# Patient Record
Sex: MALE | Race: WHITE
[De-identification: names, ages, dates, MRNs, and addresses within clinical notes are randomized per-mention and may not be internally consistent; named-entity substitution may affect disease eponyms.]

---

## 2017-02-28 ENCOUNTER — APPOINTMENT (OUTPATIENT)
Dept: PODIATRY | Facility: CLINIC | Age: 57
End: 2017-02-28

## 2018-05-24 ENCOUNTER — EMERGENCY (EMERGENCY)
Facility: HOSPITAL | Age: 58
LOS: 0 days | Discharge: HOME | End: 2018-05-24
Admitting: PHYSICIAN ASSISTANT

## 2018-05-24 VITALS
SYSTOLIC BLOOD PRESSURE: 166 MMHG | OXYGEN SATURATION: 99 % | TEMPERATURE: 98 F | HEART RATE: 75 BPM | RESPIRATION RATE: 18 BRPM | DIASTOLIC BLOOD PRESSURE: 88 MMHG

## 2018-05-24 DIAGNOSIS — J18.9 PNEUMONIA, UNSPECIFIED ORGANISM: ICD-10-CM

## 2018-05-24 DIAGNOSIS — Z88.0 ALLERGY STATUS TO PENICILLIN: ICD-10-CM

## 2018-05-24 DIAGNOSIS — Z88.1 ALLERGY STATUS TO OTHER ANTIBIOTIC AGENTS STATUS: ICD-10-CM

## 2018-05-24 DIAGNOSIS — J45.909 UNSPECIFIED ASTHMA, UNCOMPLICATED: ICD-10-CM

## 2018-05-24 DIAGNOSIS — R05 COUGH: ICD-10-CM

## 2018-05-24 NOTE — ED PROVIDER NOTE - NS ED ROS FT
Constitutional: no fever, chills, no recent weight loss, change in appetite or malaise  Eyes: no redness/discharge/pain/vision changes  ENT: no rhinorrhea/ear pain/sore throat  Cardiac: No chest pain, SOB or edema.  Respiratory: No respiratory distress  GI: No nausea, vomiting, diarrhea or abdominal pain.  : No dysuria, frequency, urgency or hematuria  MS: no pain to back or extremities, no loss of ROM, no weakness  Neuro: No headache or weakness. No LOC.  Skin: No skin rash.  Endocrine: No history of thyroid disease or diabetes.  Except as documented in the HPI, all other systems are negative.

## 2018-05-24 NOTE — ED PROVIDER NOTE - PROGRESS NOTE DETAILS
Pt aware that we will have official radiology read pending, and may result in change of xray read.  Pt voices understanding of use of medications, instructions for care, and reasons to return or to go to ED  pt has good aeration. full sentences. looks well.   limits of zpak dw pt who agrees return if worse.    pt agrees to ER if any worse.  options of abx, waiting, inhaler, steroids, OTC rx all considered and dw pt.  dw pt caution advised with medication's that make you drowsy

## 2018-05-24 NOTE — ED PROVIDER NOTE - OBJECTIVE STATEMENT
cough for 2 weeks, productive of green sputum  no f/c/n/v, sob/cp, etc  no sick contacts, no recent travel  h/o bronchitis in the past, feels the same

## 2018-06-06 ENCOUNTER — EMERGENCY (EMERGENCY)
Facility: HOSPITAL | Age: 58
LOS: 0 days | Discharge: HOME | End: 2018-06-06
Admitting: PHYSICIAN ASSISTANT

## 2018-06-06 VITALS
DIASTOLIC BLOOD PRESSURE: 88 MMHG | HEART RATE: 64 BPM | SYSTOLIC BLOOD PRESSURE: 169 MMHG | RESPIRATION RATE: 18 BRPM | TEMPERATURE: 98 F | OXYGEN SATURATION: 100 %

## 2018-06-06 DIAGNOSIS — M10.9 GOUT, UNSPECIFIED: ICD-10-CM

## 2018-06-06 DIAGNOSIS — Z88.1 ALLERGY STATUS TO OTHER ANTIBIOTIC AGENTS STATUS: ICD-10-CM

## 2018-06-06 DIAGNOSIS — Z79.899 OTHER LONG TERM (CURRENT) DRUG THERAPY: ICD-10-CM

## 2018-06-06 DIAGNOSIS — J45.909 UNSPECIFIED ASTHMA, UNCOMPLICATED: ICD-10-CM

## 2018-06-06 DIAGNOSIS — Z88.0 ALLERGY STATUS TO PENICILLIN: ICD-10-CM

## 2018-06-06 DIAGNOSIS — F17.200 NICOTINE DEPENDENCE, UNSPECIFIED, UNCOMPLICATED: ICD-10-CM

## 2018-06-06 RX ORDER — FLUTICASONE PROPIONATE AND SALMETEROL 50; 250 UG/1; UG/1
1 POWDER ORAL; RESPIRATORY (INHALATION)
Qty: 0 | Refills: 0 | COMMUNITY

## 2018-06-06 RX ORDER — ALLOPURINOL 300 MG
0 TABLET ORAL
Qty: 0 | Refills: 0 | COMMUNITY

## 2018-06-06 RX ORDER — ALLOPURINOL 300 MG
2 TABLET ORAL
Qty: 84 | Refills: 0 | OUTPATIENT
Start: 2018-06-06 | End: 2018-06-19

## 2018-06-06 RX ORDER — ALBUTEROL 90 UG/1
2 AEROSOL, METERED ORAL
Qty: 0 | Refills: 0 | COMMUNITY

## 2018-06-06 RX ORDER — COLCHICINE 0.6 MG
2 TABLET ORAL
Qty: 3 | Refills: 0 | OUTPATIENT
Start: 2018-06-06 | End: 2018-06-06

## 2018-06-06 NOTE — ED PROVIDER NOTE - MEDICAL DECISION MAKING DETAILS
colchicine; refill allopurinol; follow up with PCP in 2-3 days; any new or worsening symptoms, pt will return to ER colchicine; refill allopurinol; follow up with PCP in 2-3 days; any new or worsening symptoms, pt will return to ER  Note complete

## 2018-06-06 NOTE — ED PROVIDER NOTE - OBJECTIVE STATEMENT
57 y.o. male with a PMHx of gout presented to ER c/o gout flare up 1st great toe since this AM.  Pt poorly compliant with diet.  Pt also here for re-check and follow up pneumonia.  Pt completed full course of po Levaquin.  Pt denies fever, chills, SOB.  Doing better.  Pt only seen here 5/25/18.  CXR officially read as negative. 57 y.o. male with a PMHx of gout presented to ER c/o gout flare up 1st great toe since this AM.  Pt poorly compliant with diet.  Pt also here for re-check and follow up pneumonia.  Pt completed full course of po Levaquin.  Pt denies fever, chills, SOB.  Doing better.  Pt only seen here 5/24/18.  CXR officially read as negative.

## 2018-06-06 NOTE — ED PROVIDER NOTE - MUSCULOSKELETAL, MLM
Spine appears normal, range of motion is not limited, (+) mild tenderness without erythema or swelling Right first MTP joint; FROM, no motor or sensory deficit; cap refill < 2 sec

## 2018-08-09 PROBLEM — J45.909 UNSPECIFIED ASTHMA, UNCOMPLICATED: Chronic | Status: ACTIVE | Noted: 2018-05-24

## 2018-08-09 PROBLEM — M10.9 GOUT, UNSPECIFIED: Chronic | Status: ACTIVE | Noted: 2018-05-24

## 2018-08-15 ENCOUNTER — OUTPATIENT (OUTPATIENT)
Dept: OUTPATIENT SERVICES | Facility: HOSPITAL | Age: 58
LOS: 1 days | Discharge: HOME | End: 2018-08-15

## 2018-08-15 DIAGNOSIS — N63.10 UNSPECIFIED LUMP IN THE RIGHT BREAST, UNSPECIFIED QUADRANT: ICD-10-CM

## 2018-08-15 DIAGNOSIS — N63.20 UNSPECIFIED LUMP IN THE LEFT BREAST, UNSPECIFIED QUADRANT: ICD-10-CM

## 2018-08-15 DIAGNOSIS — N62 HYPERTROPHY OF BREAST: ICD-10-CM

## 2018-10-19 ENCOUNTER — EMERGENCY (EMERGENCY)
Facility: HOSPITAL | Age: 58
LOS: 0 days | Discharge: HOME | End: 2018-10-19
Admitting: PHYSICIAN ASSISTANT

## 2018-10-19 VITALS
DIASTOLIC BLOOD PRESSURE: 105 MMHG | RESPIRATION RATE: 18 BRPM | SYSTOLIC BLOOD PRESSURE: 182 MMHG | TEMPERATURE: 98 F | OXYGEN SATURATION: 97 % | HEART RATE: 68 BPM

## 2018-10-19 VITALS — HEART RATE: 70 BPM | DIASTOLIC BLOOD PRESSURE: 90 MMHG | SYSTOLIC BLOOD PRESSURE: 179 MMHG

## 2018-10-19 DIAGNOSIS — Z79.2 LONG TERM (CURRENT) USE OF ANTIBIOTICS: ICD-10-CM

## 2018-10-19 DIAGNOSIS — Z88.0 ALLERGY STATUS TO PENICILLIN: ICD-10-CM

## 2018-10-19 DIAGNOSIS — S05.01XA INJURY OF CONJUNCTIVA AND CORNEAL ABRASION WITHOUT FOREIGN BODY, RIGHT EYE, INITIAL ENCOUNTER: ICD-10-CM

## 2018-10-19 DIAGNOSIS — Z79.51 LONG TERM (CURRENT) USE OF INHALED STEROIDS: ICD-10-CM
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
## 2018-10-19 DIAGNOSIS — J45.909 UNSPECIFIED ASTHMA, UNCOMPLICATED: ICD-10-CM

## 2018-10-19 DIAGNOSIS — Z79.899 OTHER LONG TERM (CURRENT) DRUG THERAPY: ICD-10-CM
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
## 2018-10-19 DIAGNOSIS — H57.89 OTHER SPECIFIED DISORDERS OF EYE AND ADNEXA: ICD-10-CM

## 2018-10-19 DIAGNOSIS — Z88.1 ALLERGY STATUS TO OTHER ANTIBIOTIC AGENTS STATUS: ICD-10-CM

## 2018-10-19 RX ORDER — POLYMYXIN B SULF/TRIMETHOPRIM 10000-1/ML
1 DROPS OPHTHALMIC (EYE)
Qty: 1 | Refills: 0 | OUTPATIENT
Start: 2018-10-19 | End: 2018-10-25

## 2018-10-19 NOTE — ED ADULT NURSE NOTE - OBJECTIVE STATEMENT
Pt came to the ER today with c/o right eye discharge and tearing for 5 days. denies change in vision denies blurry vision.

## 2018-10-19 NOTE — ED PROVIDER NOTE - NS ED ROS FT
Constitutional: (-) fever  Eyes: (+) tearing, itchy, and white discharge right eye  Neurological: (-) altered mental status

## 2018-10-19 NOTE — ED PROVIDER NOTE - MEDICAL DECISION MAKING DETAILS
Right eye injected, floursein stain +abrasion, abx eye drops prescribed, advised f/u with Eye doctor

## 2018-10-19 NOTE — ED PROVIDER NOTE - OBJECTIVE STATEMENT
59 yo F hx of gout, asthma here c/o right eye itchy and white discharge x 4 days. Patient states he has been rubbing his eye a lot and had some pain 2 days ago. +increased tearing since. No blurry or changes in vision. No HA or n/v.

## 2018-10-19 NOTE — ED PROVIDER NOTE - PHYSICAL EXAMINATION
Physical Exam    Vital Signs: I have reviewed the initial vital signs.  Constitutional: well-nourished, appears stated age, no acute distress  Eyes: PERRL, EOMI. VA 20/40 OD, 20/30 OS. Right eye injected,  +corneal abrasion seen with Floursein stain. No discharge noted  Neuro: AOx3, Motor 5/5, Sensation: equal and intact

## 2019-01-30 ENCOUNTER — OUTPATIENT (OUTPATIENT)
Dept: OUTPATIENT SERVICES | Facility: HOSPITAL | Age: 59
LOS: 1 days | Discharge: HOME | End: 2019-01-30

## 2019-01-30 DIAGNOSIS — M25.512 PAIN IN LEFT SHOULDER: ICD-10-CM

## 2019-07-11 ENCOUNTER — EMERGENCY (EMERGENCY)
Facility: HOSPITAL | Age: 59
LOS: 0 days | Discharge: HOME | End: 2019-07-11
Attending: EMERGENCY MEDICINE | Admitting: EMERGENCY MEDICINE
Payer: COMMERCIAL

## 2019-07-11 VITALS
RESPIRATION RATE: 20 BRPM | HEART RATE: 66 BPM | DIASTOLIC BLOOD PRESSURE: 85 MMHG | SYSTOLIC BLOOD PRESSURE: 170 MMHG | TEMPERATURE: 97 F | OXYGEN SATURATION: 98 %

## 2019-07-11 DIAGNOSIS — Z79.51 LONG TERM (CURRENT) USE OF INHALED STEROIDS: ICD-10-CM

## 2019-07-11 DIAGNOSIS — Z79.899 OTHER LONG TERM (CURRENT) DRUG THERAPY: ICD-10-CM

## 2019-07-11 DIAGNOSIS — R42 DIZZINESS AND GIDDINESS: ICD-10-CM

## 2019-07-11 DIAGNOSIS — H93.12 TINNITUS, LEFT EAR: ICD-10-CM

## 2019-07-11 DIAGNOSIS — J45.909 UNSPECIFIED ASTHMA, UNCOMPLICATED: ICD-10-CM

## 2019-07-11 DIAGNOSIS — R11.2 NAUSEA WITH VOMITING, UNSPECIFIED: ICD-10-CM

## 2019-07-11 LAB
ALBUMIN SERPL ELPH-MCNC: 4.8 G/DL — SIGNIFICANT CHANGE UP (ref 3.5–5.2)
ALP SERPL-CCNC: 65 U/L — SIGNIFICANT CHANGE UP (ref 30–115)
ALT FLD-CCNC: 24 U/L — SIGNIFICANT CHANGE UP (ref 0–41)
ANION GAP SERPL CALC-SCNC: 13 MMOL/L — SIGNIFICANT CHANGE UP (ref 7–14)
AST SERPL-CCNC: 22 U/L — SIGNIFICANT CHANGE UP (ref 0–41)
BASOPHILS # BLD AUTO: 0.03 K/UL — SIGNIFICANT CHANGE UP (ref 0–0.2)
BASOPHILS NFR BLD AUTO: 0.3 % — SIGNIFICANT CHANGE UP (ref 0–1)
BILIRUB SERPL-MCNC: 1 MG/DL — SIGNIFICANT CHANGE UP (ref 0.2–1.2)
BUN SERPL-MCNC: 15 MG/DL — SIGNIFICANT CHANGE UP (ref 10–20)
CALCIUM SERPL-MCNC: 10.1 MG/DL — SIGNIFICANT CHANGE UP (ref 8.5–10.1)
CHLORIDE SERPL-SCNC: 102 MMOL/L — SIGNIFICANT CHANGE UP (ref 98–110)
CO2 SERPL-SCNC: 26 MMOL/L — SIGNIFICANT CHANGE UP (ref 17–32)
CREAT SERPL-MCNC: 1 MG/DL — SIGNIFICANT CHANGE UP (ref 0.7–1.5)
EOSINOPHIL # BLD AUTO: 0.01 K/UL — SIGNIFICANT CHANGE UP (ref 0–0.7)
EOSINOPHIL NFR BLD AUTO: 0.1 % — SIGNIFICANT CHANGE UP (ref 0–8)
GLUCOSE SERPL-MCNC: 123 MG/DL — HIGH (ref 70–99)
HCT VFR BLD CALC: 46.1 % — SIGNIFICANT CHANGE UP (ref 42–52)
HGB BLD-MCNC: 16 G/DL — SIGNIFICANT CHANGE UP (ref 14–18)
IMM GRANULOCYTES NFR BLD AUTO: 0.3 % — SIGNIFICANT CHANGE UP (ref 0.1–0.3)
LYMPHOCYTES # BLD AUTO: 1.03 K/UL — LOW (ref 1.2–3.4)
LYMPHOCYTES # BLD AUTO: 11.9 % — LOW (ref 20.5–51.1)
MCHC RBC-ENTMCNC: 31.1 PG — HIGH (ref 27–31)
MCHC RBC-ENTMCNC: 34.7 G/DL — SIGNIFICANT CHANGE UP (ref 32–37)
MCV RBC AUTO: 89.7 FL — SIGNIFICANT CHANGE UP (ref 80–94)
MONOCYTES # BLD AUTO: 0.39 K/UL — SIGNIFICANT CHANGE UP (ref 0.1–0.6)
MONOCYTES NFR BLD AUTO: 4.5 % — SIGNIFICANT CHANGE UP (ref 1.7–9.3)
NEUTROPHILS # BLD AUTO: 7.18 K/UL — HIGH (ref 1.4–6.5)
NEUTROPHILS NFR BLD AUTO: 82.9 % — HIGH (ref 42.2–75.2)
NRBC # BLD: 0 /100 WBCS — SIGNIFICANT CHANGE UP (ref 0–0)
PLATELET # BLD AUTO: 259 K/UL — SIGNIFICANT CHANGE UP (ref 130–400)
POTASSIUM SERPL-MCNC: 5 MMOL/L — SIGNIFICANT CHANGE UP (ref 3.5–5)
POTASSIUM SERPL-SCNC: 5 MMOL/L — SIGNIFICANT CHANGE UP (ref 3.5–5)
PROT SERPL-MCNC: 7.4 G/DL — SIGNIFICANT CHANGE UP (ref 6–8)
RBC # BLD: 5.14 M/UL — SIGNIFICANT CHANGE UP (ref 4.7–6.1)
RBC # FLD: 13.5 % — SIGNIFICANT CHANGE UP (ref 11.5–14.5)
SODIUM SERPL-SCNC: 141 MMOL/L — SIGNIFICANT CHANGE UP (ref 135–146)
WBC # BLD: 8.67 K/UL — SIGNIFICANT CHANGE UP (ref 4.8–10.8)
WBC # FLD AUTO: 8.67 K/UL — SIGNIFICANT CHANGE UP (ref 4.8–10.8)

## 2019-07-11 PROCEDURE — 99284 EMERGENCY DEPT VISIT MOD MDM: CPT

## 2019-07-11 PROCEDURE — 70450 CT HEAD/BRAIN W/O DYE: CPT | Mod: 26

## 2019-07-11 RX ORDER — MECLIZINE HCL 12.5 MG
50 TABLET ORAL ONCE
Refills: 0 | Status: COMPLETED | OUTPATIENT
Start: 2019-07-11 | End: 2019-07-11

## 2019-07-11 RX ORDER — SODIUM CHLORIDE 9 MG/ML
1000 INJECTION INTRAMUSCULAR; INTRAVENOUS; SUBCUTANEOUS ONCE
Refills: 0 | Status: COMPLETED | OUTPATIENT
Start: 2019-07-11 | End: 2019-07-11

## 2019-07-11 RX ORDER — METOCLOPRAMIDE HCL 10 MG
10 TABLET ORAL ONCE
Refills: 0 | Status: COMPLETED | OUTPATIENT
Start: 2019-07-11 | End: 2019-07-11

## 2019-07-11 RX ORDER — MECLIZINE HCL 12.5 MG
1 TABLET ORAL
Qty: 21 | Refills: 0
Start: 2019-07-11 | End: 2019-07-17

## 2019-07-11 RX ADMIN — SODIUM CHLORIDE 1000 MILLILITER(S): 9 INJECTION INTRAMUSCULAR; INTRAVENOUS; SUBCUTANEOUS at 10:36

## 2019-07-11 RX ADMIN — Medication 50 MILLIGRAM(S): at 11:03

## 2019-07-11 RX ADMIN — Medication 10 MILLIGRAM(S): at 10:36

## 2019-07-11 NOTE — ED PROVIDER NOTE - NSFOLLOWUPINSTRUCTIONS_ED_ALL_ED_FT
Viral Gastroenteritis, Adult    Viral gastroenteritis is also known as the stomach flu. This condition is caused by certain germs (viruses). These germs can be passed from person to person very easily (are very contagious). This condition can cause sudden watery poop (diarrhea), fever, and throwing up (vomiting).    Having watery poop and throwing up can make you feel weak and cause you to get dehydrated. Dehydration can make you tired and thirsty, make you have a dry mouth, and make it so you pee (urinate) less often. Older adults and people with other diseases or a weak defense system (immune system) are at higher risk for dehydration. It is important to replace the fluids that you lose from having watery poop and throwing up.    HOME CARE  Follow instructions from your doctor about how to care for yourself at home.    Eating and Drinking    Follow these instructions as told by your doctor:    Take an oral rehydration solution (ORS). This is a drink that is sold at pharmacies and stores.   Drink clear fluids in small amounts as you are able, such as:  Water.  Ice chips.  Diluted fruit juice.  Low-calorie sports drinks.  Eat bland, easy-to-digest foods in small amounts as you are able, such as:  Bananas.  Applesauce.  Rice.  Low-fat (lean) meats.  Toast.  Crackers.  Avoid fluids that have a lot of sugar or caffeine in them, such as:  Energy drinks.  Sports drinks.  Soda.  Avoid alcohol.  Avoid spicy or fatty foods.    General Instructions    Drink enough fluid to keep your pee (urine) clear or pale yellow.  Wash your hands often. If you cannot use soap and water, use hand .  Make sure that all people in your home wash their hands well and often.  Rest at home while you get better.  Take over-the-counter and prescription medicines only as told by your doctor.  Watch your condition for any changes.  Take a warm bath to help with any burning or pain from having watery poop.  Keep all follow-up visits as told by your doctor. This is important.    GET HELP IF:  You cannot keep fluids down.  Your symptoms get worse.  You have new symptoms.  You feel light-headed or dizzy.  You have muscle cramps.    GET HELP RIGHT AWAY IF:  You have chest pain.  You feel very weak or you pass out (faint).  You see blood in your throw-up.  Your throw-up looks like coffee grounds.  You have bloody or black poop (stools) or poop that look like tar.  You have a very bad headache, a stiff neck, or both.  You have a rash.  You have very bad pain, cramping, or bloating in your belly (abdomen).  You have trouble breathing.  You are breathing very quickly.  Your heart is beating very quickly.  Your skin feels cold and clammy.  You feel confused.  You have pain when you pee.  You have signs of dehydration, such as:  Dark pee, hardly any pee, or no pee.  Cracked lips.  Dry mouth.  Sunken eyes.  Sleepiness.  Weakness.    ADDITIONAL NOTES AND INSTRUCTIONS    Please follow up with your Primary MD in 24-48 hr.  Seek immediate medical care for any new/worsening signs or symptoms.

## 2019-07-11 NOTE — ED ADULT NURSE NOTE - OBJECTIVE STATEMENT
Pt aox3, in no acute distress, c/o dizziness and vomiting that began today. Pt states he felt as if the room were spinning. Pt denies headache or any pain, denies diarrhea. Pt taken to CT scan, wife at bedside, will continue to monitor.

## 2019-07-11 NOTE — ED PROVIDER NOTE - CARE PROVIDER_API CALL
Lawson Meyer)  Otolaryngology  [address] [place] [address], [address] [place] [address]  Phone: [phone]  Fax: [phone]  Follow Up Time:

## 2019-07-11 NOTE — ED PROVIDER NOTE - NS ED ROS FT
Constitutional:  See HPI.   Eyes:  No visual changes, eye pain or discharge.  ENMT:  No hearing changes, pain, discharge or infections. No neck pain or stiffness.  Cardiac:  No chest pain, SOB or edema. No chest pain with exertion.  Respiratory:  No cough or respiratory distress. No hemoptysis.  GI:  No  diarrhea, abdominal pain.  :  No dysuria, frequency, hematuria  MS:  No joint pain or back pain.  Neuro:  No LOC. No headache or weakness.    Skin:  No skin rash.  Except as in HPI, all other review of systems is negative

## 2019-07-11 NOTE — ED PROVIDER NOTE - PHYSICAL EXAMINATION
Constitutional: Well appearing. No acute distress. Non toxic.   Eyes: PERRLA. Extraocular movements intact, no entrapment. Conjunctiva normal.   Ears: Normal EAM's b/l. b/l TMs clear- no perforation, +light reflex. No mastoid tenderness.  Nose: No turbinate edema, hematoma, epistaxis.  Throat: +MMM. No posterior oropharyngeal erythema, edema, lesions.  Uvula midline. Floor of mouth soft.   Neck: Supple, non tender, full range of motion.  CV: RRR no murmurs, rubs, or gallops. +S1S2.   Pulm: Clear to auscultation bilaterally. Normal work of breathing.  Abd: soft NT ND +BS.   Ext: Warm and well perfused x4, moving all extremities, no edema.   Psy: Cooperative, appropriate.   Skin: Warm, dry, no rash  Neuro: CN2-12 grossly intact no sensory or motor deficits throughout, no drift, no ataxia, rapid alternating within normal limits, neg romberg.

## 2019-07-11 NOTE — ED PROVIDER NOTE - CHPI ED SYMPTOMS NEG
no fever/no loss of consciousness/no numbness/no weakness/no confusion/no blurred vision/no change in level of consciousness

## 2019-07-11 NOTE — ED PROVIDER NOTE - CLINICAL SUMMARY MEDICAL DECISION MAKING FREE TEXT BOX
Pt presenting with vertigo, labs, CTH normal. Pt feeling improved. PERRLA, neck full ROM, no nuchal rigidity, CN2-12 grossly intact, no sensory or motor deficits throughout, no ataxia, no drift. Rapid alternating intact. Pt has follow-up with ENT next week

## 2019-07-11 NOTE — ED PROVIDER NOTE - PROGRESS NOTE DETAILS
Pt feeling improved. PERRLA, neck full ROM, no nuchal rigidity, CN2-12 grossly intact, no sensory or motor deficits throughout, no ataxia, no drift. Rapid alternating intact. Pt feeling improved. PERRLA, neck full ROM, no nuchal rigidity, CN2-12 grossly intact, no sensory or motor deficits throughout, no ataxia, no drift. Rapid alternating intact. Tolerating PO. Patient and family aware of all results, given a copy of all results, comfortable with discharge and follow-up outpatient, strict return precautions given. Patient endorses understanding of all of this and aware that they can return at any time for new or concerning symptoms. No further questions or concerns at this time

## 2019-07-11 NOTE — ED ADULT NURSE NOTE - NSIMPLEMENTINTERV_GEN_ALL_ED
Implemented All Universal Safety Interventions:  [place] to call system. Call bell, personal items and telephone within reach. Instruct patient to call for assistance. Room bathroom lighting operational. Non-slip footwear when patient is off stretcher. Physically safe environment: no spills, clutter or unnecessary equipment. Stretcher in lowest position, wheels locked, appropriate side rails in place.

## 2019-07-15 ENCOUNTER — APPOINTMENT (OUTPATIENT)
Dept: OTOLARYNGOLOGY | Facility: CLINIC | Age: 59
End: 2019-07-15
Payer: COMMERCIAL

## 2019-07-15 VITALS — HEIGHT: 68 IN

## 2019-07-15 DIAGNOSIS — M10.9 GOUT, UNSPECIFIED: ICD-10-CM

## 2019-07-15 DIAGNOSIS — H91.93 UNSPECIFIED HEARING LOSS, BILATERAL: ICD-10-CM

## 2019-07-15 DIAGNOSIS — R42 DIZZINESS AND GIDDINESS: ICD-10-CM

## 2019-07-15 DIAGNOSIS — Z78.9 OTHER SPECIFIED HEALTH STATUS: ICD-10-CM

## 2019-07-15 DIAGNOSIS — J45.909 UNSPECIFIED ASTHMA, UNCOMPLICATED: ICD-10-CM

## 2019-07-15 PROCEDURE — 99204 OFFICE O/P NEW MOD 45 MIN: CPT | Mod: 25

## 2019-07-15 PROCEDURE — 92557 COMPREHENSIVE HEARING TEST: CPT

## 2019-07-15 PROCEDURE — 92570 ACOUSTIC IMMITANCE TESTING: CPT

## 2019-07-15 RX ORDER — ALBUTEROL SULFATE 90 UG/1
AEROSOL, METERED RESPIRATORY (INHALATION)
Refills: 0 | Status: ACTIVE | COMMUNITY

## 2019-07-15 RX ORDER — MECLIZINE HYDROCHLORIDE 25 MG/1
TABLET ORAL
Refills: 0 | Status: ACTIVE | COMMUNITY

## 2019-07-15 RX ORDER — ALLOPURINOL 200 MG/1
TABLET ORAL
Refills: 0 | Status: ACTIVE | COMMUNITY

## 2019-07-15 NOTE — CONSULT LETTER
[Dear  ___] : Dear  [unfilled], [Consult Letter:] : I had the pleasure of evaluating your patient, [unfilled]. [Please see my note below.] : Please see my note below. [Consult Closing:] : Thank you very much for allowing me to participate in the care of this patient.  If you have any questions, please do not hesitate to contact me. [Sincerely,] : Sincerely, [FreeTextEntry2] : Martín Coffman MD\par [address]\par [place] [address] [FreeTextEntry3] : Bhumika Holliday MD\par Otolaryngology - Head & Neck Surgery\par

## 2019-07-15 NOTE — PHYSICAL EXAM
[Midline] : trachea located in midline position [] : [place]-Hallpike test is positive [Normal] : no rashes [de-identified] : nystagmus to right, did not tolerate Eply

## 2019-07-15 NOTE — HISTORY OF PRESENT ILLNESS
[de-identified] : 58 year old patient is present today or vertigo. 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 Symptoms include cheek pain, swelling in the parotid region on left, then on right, alternates sides. He also gets itchy in that area. Is painful when it swells. He is not having fevers with this. This has been biopsied, negative for malignancy.  Patient was seen in the ED on 7/11/19 for vertigo, this has persisted. This occurs when he lays down at night to sleep, right side down and head back. He is taking Meclizine, which is helping. This started 7/11/19, has not happened before. Also reports hearing loss, gradual, hx noise exposure, worse over the past 10 years.

## 2019-07-15 NOTE — ASSESSMENT
[FreeTextEntry1] : - cleared for hearing aids\par - VNG, will call with results\par - CT neck with contrast to further eval parotid swelling\par - f/up in 1 month

## 2019-07-15 NOTE — DATA REVIEWED
[de-identified] : 7/15/19: borderline/mild sloping to mod SNHL 2508khz bilateral, type A tymps bilateral [de-identified] : relevant images and reports personally reviewed by me:\par \par EXAM: CT BRAIN \par \par \par PROCEDURE DATE: 07/11/2019 \par \par \par \par \par INTERPRETATION: Clinical History / Reason for exam: Dizziness. \par \par Technique: Multiple contiguous axial CT images of the head were obtained \par from the base of the skull to the vertex without administration of \par intravenous contrast. \par \par Comparison: None available at this time. \par \par \par Findings: The ventricles, basal cisterns and sulcal pattern are slightly \par prominent consistent with parenchymal volume loss. There are scattered \par punctate foci of hypodensities in the periventricular and subcortical white \par matter which are nonspecific and without mass effect most likely consistent \par with chronic small vessel ischemic changes in a patient of this stated age. \par There is no acute mass effect, midline shift or hemorrhage. No extra-axial \par fluid collections are identified. \par \par The bones of the calvarium are intact. The paranasal sinuses, bilateral \par mastoid complexes and globes and orbits are grossly unremarkable. \par \par Beam hardening artifact is noted overlying the brain stem and posterior \par fossa which is inherent to CT in this location. \par \par \par IMPRESSION: \par \par 1. Mild periventricular and subcortical white matter chronic small vessel \par ischemic changes. \par \par 2. No acute mass effect, midline shift or hemorrhage. \par \par 3. If the patient continues to be symptomatic follow-up MRI of the brain \par may be helpful for further evaluation. \par \par \par \par \par \par \par \par \par KIMBERLY CASE M.D., ATTENDING RADIOLOGIST \par This document has been electronically signed. Jul 11 2019 10:40AM

## 2019-08-02 ENCOUNTER — OTHER (OUTPATIENT)
Age: 59
End: 2019-08-02

## 2019-08-16 ENCOUNTER — OTHER (OUTPATIENT)
Age: 59
End: 2019-08-16

## 2019-08-16 RX ORDER — MECLIZINE HYDROCHLORIDE 25 MG/1
25 TABLET ORAL 3 TIMES DAILY
Qty: 30 | Refills: 0 | Status: ACTIVE | COMMUNITY
Start: 2019-08-02 | End: 1900-01-01

## 2019-08-21 ENCOUNTER — OUTPATIENT (OUTPATIENT)
Dept: OUTPATIENT SERVICES | Facility: HOSPITAL | Age: 59
LOS: 1 days | Discharge: HOME | End: 2019-08-21

## 2019-08-21 DIAGNOSIS — R42 DIZZINESS AND GIDDINESS: ICD-10-CM

## 2019-09-05 ENCOUNTER — APPOINTMENT (OUTPATIENT)
Dept: OTOLARYNGOLOGY | Facility: CLINIC | Age: 59
End: 2019-09-05
Payer: COMMERCIAL

## 2019-09-05 DIAGNOSIS — R22.1 LOCALIZED SWELLING, MASS AND LUMP, NECK: ICD-10-CM

## 2019-09-05 DIAGNOSIS — K11.20 SIALOADENITIS, UNSPECIFIED: ICD-10-CM

## 2019-09-05 PROCEDURE — 99213 OFFICE O/P EST LOW 20 MIN: CPT

## 2019-09-05 NOTE — DATA REVIEWED
[de-identified] : 8/21/19: VNG: canalithiasis of right posterio SCC and possible non-localing CNS lesion.\par \par 7/15/19: borderline/mild sloping to mod SNHL 2508khz bilateral, type A tymps bilateral \par CT/MRI: relevant images and reports personally reviewed by me:\par \par EXAM: CT BRAIN \par \par \par PROCEDURE DATE: 07/11/2019 \par \par \par \par \par INTERPRETATION: Clinical History / Reason for exam: Dizziness. \par \par Technique: Multiple contiguous axial CT images of the head were obtained \par from the base of the skull to the vertex without administration of \par intravenous contrast. \par \par Comparison: None available at this time. \par \par \par Findings: The ventricles, basal cisterns and sulcal pattern are slightly \par prominent consistent with parenchymal volume loss. There are scattered \par punctate foci of hypodensities in the periventricular and subcortical white \par matter which are nonspecific and without mass effect most likely consistent \par with chronic small vessel ischemic changes in a patient of this stated age. \par There is no acute mass effect, midline shift or hemorrhage. No extra-axial \par fluid collections are identified. \par \par The bones of the calvarium are intact. The paranasal sinuses, bilateral \par mastoid complexes and globes and orbits are grossly unremarkable. \par \par Beam hardening artifact is noted overlying the brain stem and posterior \par fossa which is inherent to CT in this location. \par \par \par IMPRESSION: \par \par 1. Mild periventricular and subcortical white matter chronic small vessel \par ischemic changes. \par \par 2. No acute mass effect, midline shift or hemorrhage. \par \par 3. If the patient continues to be symptomatic follow-up MRI of the brain \par may be helpful for further evaluation. \par \par \par \par \par \par \par \par \par KIMBERLY CASE M.D., ATTENDING RADIOLOGIST \par This document has been electronically signed. Jul 11 2019 10:40AM

## 2019-09-05 NOTE — PHYSICAL EXAM
[de-identified] : left posterior neck: 1cm non-tender nodule, slightly mobile [Midline] : trachea located in midline position [] : [place]-Hallpike test is positive [Normal] : no rashes [de-identified] : nystagmus to right, did not tolerate Eply

## 2019-09-05 NOTE — HISTORY OF PRESENT ILLNESS
[de-identified] : 58 year old patient is present today or vertigo. 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 Symptoms include cheek pain, swelling in the parotid region on left, then on right, alternates sides. He also gets itchy in that area. Is painful when it swells. He is not having fevers with this. This has been biopsied, negative for malignancy.  Patient was seen in the ED on 7/11/19 for vertigo, this has persisted. This occurs when he lays down at night to sleep, right side down and head back. He is taking Meclizine, which is helping. This started 7/11/19, has not happened before. Also reports hearing loss, gradual, hx noise exposure, worse over the past 10 years.  [FreeTextEntry1] : \par 9/5/19 Patient is following up for parotitis, vertigo, and SNHL. Patient had VNG done, but CT he did not perform. Patient states states his vertigo is severe today. He states the medication does not help. He also reports lump on the back of his neck.

## 2019-09-05 NOTE — CONSULT LETTER
[Dear  ___] : Dear  [unfilled], [Consult Letter:] : I had the pleasure of evaluating your patient, [unfilled]. [Please see my note below.] : Please see my note below. [Consult Closing:] : Thank you very much for allowing me to participate in the care of this patient.  If you have any questions, please do not hesitate to contact me. [Sincerely,] : Sincerely, [FreeTextEntry3] : Bhumika Holliday MD\par Otolaryngology - Head & Neck Surgery\par  [FreeTextEntry2] : Martín Coffman MD\par [address]\par [place] [address] \par

## 2019-09-05 NOTE — REASON FOR VISIT
[Subsequent Evaluation] : a subsequent evaluation for [FreeTextEntry2] : parotitis, vertigo, and SNHL f/up

## 2019-09-09 ENCOUNTER — FORM ENCOUNTER (OUTPATIENT)
Age: 59
End: 2019-09-09

## 2019-09-10 ENCOUNTER — OUTPATIENT (OUTPATIENT)
Dept: OUTPATIENT SERVICES | Facility: HOSPITAL | Age: 59
LOS: 1 days | Discharge: HOME | End: 2019-09-10
Payer: COMMERCIAL

## 2019-09-10 DIAGNOSIS — R22.1 LOCALIZED SWELLING, MASS AND LUMP, NECK: ICD-10-CM

## 2019-09-10 PROCEDURE — 70491 CT SOFT TISSUE NECK W/DYE: CPT | Mod: 26

## 2019-09-25 ENCOUNTER — APPOINTMENT (OUTPATIENT)
Dept: OTOLARYNGOLOGY | Facility: CLINIC | Age: 59
End: 2019-09-25

## 2019-09-26 ENCOUNTER — APPOINTMENT (OUTPATIENT)
Dept: OTOLARYNGOLOGY | Facility: CLINIC | Age: 59
End: 2019-09-26
Payer: COMMERCIAL

## 2019-09-26 DIAGNOSIS — H81.11 BENIGN PAROXYSMAL VERTIGO, RIGHT EAR: ICD-10-CM

## 2019-09-26 DIAGNOSIS — H90.3 SENSORINEURAL HEARING LOSS, BILATERAL: ICD-10-CM

## 2019-09-26 PROCEDURE — 99212 OFFICE O/P EST SF 10 MIN: CPT

## 2019-09-26 NOTE — ASSESSMENT
[FreeTextEntry1] : - proceed with vestibular therapy as scheduled\par - reassured patient of unremarkable CT neck results\par - f/up annually for audiogram

## 2019-09-26 NOTE — PHYSICAL EXAM
[de-identified] : left posterior neck: 1cm non-tender nodule, slightly mobile, consistent with lymph node [de-identified] : no parotid enlargement [Midline] : trachea located in midline position [] : [place]-Hallpike test is positive [Normal] : no rashes [de-identified] : nystagmus to right, did not tolerate Eply

## 2019-09-26 NOTE — HISTORY OF PRESENT ILLNESS
[de-identified] : 58 year old patient is present today or vertigo. 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 Symptoms include cheek pain, swelling in the parotid region on left, then on right, alternates sides. He also gets itchy in that area. Is painful when it swells. He is not having fevers with this. This has been biopsied, negative for malignancy.  Patient was seen in the ED on 7/11/19 for vertigo, this has persisted. This occurs when he lays down at night to sleep, right side down and head back. He is taking Meclizine, which is helping. This started 7/11/19, has not happened before. Also reports hearing loss, gradual, hx noise exposure, worse over the past 10 years. \par \par \par 9/5/19 Patient is following up for parotitis, vertigo, and SNHL. Patient had VNG done, but CT he did not perform. Patient states states his vertigo is severe today. He states the medication does not help. He also reports lump on the back of his neck. [FreeTextEntry1] : \par 9/26/19 Patient is following up for CT neck results. Is to get balance therapy starting today or tomorrow. Had an episode of "spinning this am. No more swelling of the face.

## 2019-09-26 NOTE — DATA REVIEWED
[de-identified] : relevant images and reports personally reviewed by me:\par IMPRESSION: \par In comparison with the postcontrast CT scan of the neck dated November 18, 2015: \par Stable examination. \par No discrete mass is demonstrated. If clinically warranted, following marking of the patient's skin, the patient can \par return to [facility] radiology for additional imaging at no additional cost to the patient. \par The current examination includes a portion of the coronary arteries, demonstrating calcification, indicative of \par significant atherosclerotic disease. \par ANTONI ARECHIGA M.D., ATTENDING RADIOLOGIST \par This document has been electronically signed. Sep 10 2019 2:26PM

## 2019-09-26 NOTE — CONSULT LETTER
[Dear  ___] : Dear  [unfilled], [Consult Letter:] : I had the pleasure of evaluating your patient, [unfilled]. [Please see my note below.] : Please see my note below. [Sincerely,] : Sincerely, [Consult Closing:] : Thank you very much for allowing me to participate in the care of this patient.  If you have any questions, please do not hesitate to contact me. [FreeTextEntry3] : Bhumika Holliday MD\par Otolaryngology - Head & Neck Surgery\par  [FreeTextEntry2] : Augustus Resendiz MD\par [address]\par [place] [address]

## 2020-02-11 ENCOUNTER — TRANSCRIPTION ENCOUNTER (OUTPATIENT)
Age: 60
End: 2020-02-11

## 2020-07-13 ENCOUNTER — TRANSCRIPTION ENCOUNTER (OUTPATIENT)
Age: 60
End: 2020-07-13

## 2020-10-01 ENCOUNTER — FORM ENCOUNTER (OUTPATIENT)
Age: 60
End: 2020-10-01

## 2020-10-02 ENCOUNTER — OUTPATIENT (OUTPATIENT)
Dept: OUTPATIENT SERVICES | Facility: HOSPITAL | Age: 60
LOS: 1 days | Discharge: HOME | End: 2020-10-02

## 2020-10-02 DIAGNOSIS — F31.0 BIPOLAR DISORDER, CURRENT EPISODE HYPOMANIC: ICD-10-CM

## 2020-10-10 ENCOUNTER — TRANSCRIPTION ENCOUNTER (OUTPATIENT)
Age: 60
End: 2020-10-10

## 2020-10-27 ENCOUNTER — APPOINTMENT (OUTPATIENT)
Dept: PODIATRY | Facility: CLINIC | Age: 60
End: 2020-10-27

## 2022-01-25 ENCOUNTER — EMERGENCY (EMERGENCY)
Facility: HOSPITAL | Age: 62
LOS: 0 days | Discharge: HOME | End: 2022-01-25
Attending: EMERGENCY MEDICINE | Admitting: EMERGENCY MEDICINE
Payer: MEDICAID

## 2022-01-25 VITALS
SYSTOLIC BLOOD PRESSURE: 136 MMHG | HEART RATE: 64 BPM | TEMPERATURE: 99 F | OXYGEN SATURATION: 97 % | WEIGHT: 270.07 LBS | RESPIRATION RATE: 18 BRPM | DIASTOLIC BLOOD PRESSURE: 86 MMHG

## 2022-01-25 DIAGNOSIS — H81.10 BENIGN PAROXYSMAL VERTIGO, UNSPECIFIED EAR: ICD-10-CM

## 2022-01-25 DIAGNOSIS — I10 ESSENTIAL (PRIMARY) HYPERTENSION: ICD-10-CM

## 2022-01-25 DIAGNOSIS — Z88.0 ALLERGY STATUS TO PENICILLIN: ICD-10-CM

## 2022-01-25 DIAGNOSIS — Z88.1 ALLERGY STATUS TO OTHER ANTIBIOTIC AGENTS STATUS: ICD-10-CM

## 2022-01-25 DIAGNOSIS — H92.01 OTALGIA, RIGHT EAR: ICD-10-CM

## 2022-01-25 DIAGNOSIS — E78.5 HYPERLIPIDEMIA, UNSPECIFIED: ICD-10-CM

## 2022-01-25 PROCEDURE — 99283 EMERGENCY DEPT VISIT LOW MDM: CPT

## 2022-01-25 RX ORDER — AZTREONAM 2 G
1 VIAL (EA) INJECTION
Qty: 14 | Refills: 0
Start: 2022-01-25 | End: 2022-01-31

## 2022-01-25 NOTE — ED PROVIDER NOTE - ATTENDING CONTRIBUTION TO CARE
61 y M to ED with HTN, HLD, BPPV  presents with ear pain x 1day  pain worse in pinna with tender nodule under where he places hearing aids.

## 2022-01-25 NOTE — ED PROVIDER NOTE - PATIENT PORTAL LINK FT
You can access the FollowMyHealth Patient Portal offered by [facility] by registering at the following website: [email or web address]. By joining [employer]’s FollowMyHealth portal, you will also be able to view your health information using other applications (apps) compatible with our system.

## 2022-01-25 NOTE — ED PROVIDER NOTE - NSFOLLOWUPINSTRUCTIONS_ED_ALL_ED_FT
Earache    WHAT YOU NEED TO KNOW:    An earache can be caused by a problem within your ear or from another body area. Common causes include earwax buildup, objects in your ear, injury, infections, or jaw or dental problems. Less often, earaches may be caused by arthritis in your upper spine.    DISCHARGE INSTRUCTIONS:    Return to the emergency department if:     You have a severe earache.      You have ear pain with itching, hearing loss, dizziness, a feeling of fullness in your ear, or ringing in your ears.    Contact your healthcare provider if:     Your ear pain worsens or does not go away with treatment.      You have drainage from your ear.      You have a fever.       Your outer ear becomes red, swollen, and warm.      You have questions or concerns about your condition or care.    Medicines: You may need any of the following:     NSAIDs, such as ibuprofen, help decrease swelling, pain, and fever. This medicine is available with or without a doctor's order. NSAIDs can cause stomach bleeding or kidney problems in certain people. If you take blood thinner medicine, always ask if NSAIDs are safe for you. Always read the medicine label and follow directions. Do not give these medicines to children under 6 months of age without direction from your child's healthcare provider.      Acetaminophen decreases pain and fever. It is available without a doctor's order. Ask how much to take and how often to take it. Follow directions. Acetaminophen can cause liver damage if not taken correctly.      Do not give aspirin to children under 18 years of age. Your child could develop Reye syndrome if he takes aspirin. Reye syndrome can cause life-threatening brain and liver damage. Check your child's medicine labels for aspirin, salicylates, or oil of wintergreen.       Take your medicine as directed. Call your healthcare provider if you think your medicine is not helping or if you have side effects. Tell him if you are allergic to any medicine. Keep a list of the medicines, vitamins, and herbs you take. Include the amounts, and when and why you take them. Bring the list or the pill bottles to follow-up visits. Carry your medicine list with you in case of an emergency.    Follow up with your healthcare provider as directed: Write down your questions so you remember to ask them during your visits.       © Copyright [employer] 2019 All illustrations and images included in CareNotes are the copyrighted property of [employer] or [employer].

## 2022-01-25 NOTE — ED ADULT NURSE NOTE - HISTORY OF COVID-19 VACCINATION
Sinusitis (Antibiotic Treatment)    The sinuses are air-filled spaces within the bones of the face. They connect to the inside of the nose. Sinusitis is an inflammation of the tissue lining the sinus cavity. Sinus inflammation can occur during a cold. It can also be due to allergies to pollens and other particles in the air. Sinusitis can cause symptoms of sinus congestion and fullness. A sinus infection causes fever, headache and facial pain. There is often green or yellow drainage from the nose or into the back of the throat (post-nasal drip). You have been given antibiotics to treat this condition.  Home care:  · Take the full course of antibiotics as instructed. Do not stop taking them, even if you feel better.  · Drink plenty of water, hot tea, and other liquids. This may help thin mucus. It also may promote sinus drainage.  · Heat may help soothe painful areas of the face. Use a towel soaked in hot water. Or,  the shower and direct the hot spray onto your face. Using a vaporizer along with a menthol rub at night may also help.   · An expectorant containing guaifenesin may help thin the mucus and promote drainage from the sinuses.  · Over-the-counter decongestants may be used unless a similar medicine was prescribed. Nasal sprays work the fastest. Use one that contains phenylephrine or oxymetazoline. First blow the nose gently. Then use the spray. Do not use these medicines more often than directed on the label or symptoms may get worse. You may also use tablets containing pseudoephedrine. Avoid products that combine ingredients, because side effects may be increased. Read labels. You can also ask the pharmacist for help. (NOTE: Persons with high blood pressure should not use decongestants. They can raise blood pressure.)  · Over-the-counter antihistamines may help if allergies contributed to your sinusitis.    · Do not use nasal rinses or irrigation during an acute sinus infection, unless told to by  your health care provider. Rinsing may spread the infection to other sinuses.  · Use acetaminophen or ibuprofen to control pain, unless another pain medicine was prescribed. (If you have chronic liver or kidney disease or ever had a stomach ulcer, talk with your doctor before using these medicines. Aspirin should never be used in anyone under 18 years of age who is ill with a fever. It may cause severe liver damage.)  · Don't smoke. This can worsen symptoms.  Follow-up care  Follow up with your healthcare provider or our staff if you are not improving within the next week.  When to seek medical advice  Call your healthcare provider if any of these occur:  · Facial pain or headache becoming more severe  · Stiff neck  · Unusual drowsiness or confusion  · Swelling of the forehead or eyelids  · Vision problems, including blurred or double vision  · Fever of 100.4ºF (38ºC) or higher, or as directed by your healthcare provider  · Seizure  · Breathing problems  · Symptoms not resolving within 10 days  Date Last Reviewed: 4/13/2015  © 8236-1732 [employer] [address] [place] [address]. All rights reserved. This information is not intended as a substitute for professional medical care. Always follow your healthcare professional's instructions.                                                                    Sinusitis   If your condition worsens or fails to improve we recommend that you receive another evaluation at the ER immediately or contact your PCP to discuss your concerns or return here. You must understand that you've received an urgent care treatment only and that you may be released before all your medical problems are known or treated. You the patient will arrange for followup care as instructed.   If we discussed that I think your illness is viral it will not respond to antibiotics and it will last 10-14 days. However, if over the next few days the symptoms worsen start the  "antibiotics I have given you.   If we discussed that you require antibiotics start them now and take them to completion.   If you are female and on BCP and do take the antibiotics, use additional methods to prevent pregnancy while on the antibiotics and for one cycle after.   Flonase (fluticasone) is a nasal spray which is available over the counter and may help with your symptoms   Zyrtec D, Claritin D or allegra D can also help with symptoms of congestion and drainage.   If you have hypertension avoid using the "D" which is the decongestant   If you just have drainage you can take plain zyrtec, claritin or allegra   If you just have a congested feeling you can take pseudoephedrine (unless you have high blood pressure) which you have to sign for behind the counter. Do not buy the phenylephrine which is on the shelf as it is not effective   Rest and fluids are also important.   Tylenol or ibuprofen can also be used as directed for pain unless you have an allergy to them or medical condition such as stomach ulcers, kidney or liver disease or blood thinners etc for which you should not be taking these type of medications.   If you are flying in the next few days Afrin nose drops for the airplane flight upon take off and landing may help. Other than at those times refrain from using afrin.   If you were prescribed a narcotic do not drive or operate heavy machinery while taking these medications.     -Please take antibiotic to completion.  -Below are suggestions for symptomatic relief:              -Tylenol every 4 hours OR ibuprofen every 6 hours as needed for pain/fever.              -Salt water gargles to soothe throat pain.              -Chloroseptic spray also helps to numb throat pain.              -Nasal saline spray reduces inflammation and dryness.              -Warm face compresses to help with facial sinus pain/pressure.              -Vicks vapor rub at night.              -Flonase OTC or Nasacort OTC for " nasal congestion.              -Simple foods like chicken noodle soup.              -Delsym helps with coughing at night              -Zyrtec/Claritin during the day & Benadryl at night may help with allergies.    -Your chest xray today was negative.  -Start the Xyzal daily and the Nasacort daily.  -10 days of antibiotics.  -Be sure to drink plenty of fluids.  -Cough syrup as needed for the cough.  -Follow up with your primary care doctor.  -If your symptoms worsen go to the ER.    Please follow up with your Primary care provider within 2-5 days if your signs and symptoms have not resolved or worsen.     If your condition worsens or fails to improve we recommend that you receive another evaluation at the emergency room immediately or contact your primary medical clinic to discuss your concerns.   You must understand that you have received an Urgent Care treatment only and that you may be released before all of your medical problems are known or treated. You, the patient, will arrange for follow up care as instructed.      Yes

## 2022-01-25 NOTE — ED PROVIDER NOTE - OBJECTIVE STATEMENT
61-year-old male history of hypertension, hyperlipidemia, BPPV presenting with right ear pain since last night.  Patient wears hearing aids and noticed that he had small area of fluctuance at the entrance of the ear canal last night.  Denies drainage, fever, headache, nausea, vomiting, hearing changes.

## 2022-01-25 NOTE — ED PROVIDER NOTE - CARE PROVIDER_API CALL
Bhumika Holliday)  Otolaryngology  [address] [place] [address], [address] [place] [address]  Phone: [phone]  Fax: [phone]  Follow Up Time: 1-3 Days

## 2022-01-25 NOTE — ED PROVIDER NOTE - CARE PROVIDERS DIRECT ADDRESSES
[email or web address]

## 2022-01-25 NOTE — ED PROVIDER NOTE - PHYSICAL EXAMINATION
Add 06968 Cpt? (Important Note: In 2017 The Use Of 85875 Is Being Tracked By Cms To Determine Future Global Period Reimbursement For Global Periods): yes Detail Level: Detailed CONSTITUTIONAL: Well-developed; well-nourished; in no acute distress.   SKIN: warm, dry  HEAD: Normocephalic; atraumatic.  EYES: PERRL, EOMI, normal sclera and conjunctiva   ENT: 0.5 cm area of fluctuance at entrance of R ear canal, mild TTP, no discharge, no crepitus, no mastoid tenderness; TMs clear b/l   NECK: Supple; non tender.  CARD: S1, S2 normal; no murmurs, gallops, or rubs. Regular rate and rhythm.   RESP: No wheezes, rales or rhonchi.  ABD: soft ntnd  EXT: Normal ROM.  No clubbing, cyanosis or edema.   LYMPH: No acute cervical adenopathy.  NEURO: Alert, oriented, grossly unremarkable  PSYCH: Cooperative, appropriate.

## 2022-01-25 NOTE — ED PROVIDER NOTE - NS ED ROS FT
Constitutional:  see HPI  Head:  no headache, dizziness, loss of consciousness  Eyes:  no visual changes; no eye pain, redness, or discharge  ENMT:  ear pain  Cardiac: no chest pain, tachycardia or palpitations  Respiratory: no cough, wheezing, shortness of breath, chest tightness, or trouble breathing  GI: no nausea, vomiting, diarrhea or abdominal pain  :  no dysuria, frequency, or burning with urination; no change in urine output  MS: no myalgias, muscle weakness, joint pain,or  injury; no joint swelling  Neuro: no weakness; no numbness or tingling; no seizure  Skin:  no rashes or color changes; no lacerations or abrasions

## 2022-03-24 ENCOUNTER — APPOINTMENT (OUTPATIENT)
Dept: NEUROSURGERY | Facility: CLINIC | Age: 62
End: 2022-03-24
Payer: MEDICAID

## 2022-03-24 VITALS — WEIGHT: 260 LBS | HEIGHT: 68 IN | BODY MASS INDEX: 39.4 KG/M2

## 2022-03-24 DIAGNOSIS — Z86.79 PERSONAL HISTORY OF OTHER DISEASES OF THE CIRCULATORY SYSTEM: ICD-10-CM

## 2022-03-24 DIAGNOSIS — Z87.09 PERSONAL HISTORY OF OTHER DISEASES OF THE RESPIRATORY SYSTEM: ICD-10-CM

## 2022-03-24 PROCEDURE — 99204 OFFICE O/P NEW MOD 45 MIN: CPT

## 2022-03-24 NOTE — PHYSICAL EXAM
[General Appearance - Alert] : alert [General Appearance - In No Acute Distress] : in no acute distress [General Appearance - Well-Appearing] : healthy appearing [Oriented To Time, Place, And Person] : oriented to person, place, and time [Impaired Insight] : insight and judgment were intact [Affect] : the affect was normal [Person] : oriented to person [Place] : oriented to place [Time] : oriented to time [Short Term Intact] : short term memory intact [Remote Intact] : remote memory intact [Span Intact] : the attention span was normal [Concentration Intact] : normal concentrating ability [Fluency] : fluency intact [Comprehension] : comprehension intact [Current Events] : adequate knowledge of current events [Past History] : adequate knowledge of personal past history [Vocabulary] : adequate range of vocabulary [Cranial Nerves Optic (II)] : visual acuity intact bilaterally,  pupils equal round and reactive to light [Cranial Nerves Oculomotor (III)] : extraocular motion intact [Cranial Nerves Trigeminal (V)] : facial sensation intact symmetrically [Cranial Nerves Facial (VII)] : face symmetrical [Cranial Nerves Vestibulocochlear (VIII)] : hearing was intact bilaterally [Cranial Nerves Glossopharyngeal (IX)] : tongue and palate midline [Cranial Nerves Accessory (XI - Cranial And Spinal)] : head turning and shoulder shrug symmetric [Cranial Nerves Hypoglossal (XII)] : there was no tongue deviation with protrusion [Motor Tone] : muscle tone was normal in all four extremities [Motor Strength] : muscle strength was normal in all four extremities [No Muscle Atrophy] : normal bulk in all four extremities [Sensation Tactile Decrease] : light touch was intact [Abnormal Walk] : normal gait [2+] : Ankle jerk left 2+ [Past-pointing] : there was no past-pointing [Tremor] : no tremor present [___] : absent on the right [___] : absent on the left [Govea] : Govea's sign was not demonstrated [FreeTextEntry6] : Trace weakness right arm.  [Straight-Leg Raise Test - Left] : straight leg raise of the left leg was negative [Straight-Leg Raise Test - Right] : straight leg raise  of the right leg was negative [FreeTextEntry1] : Restriction in ROM

## 2022-03-24 NOTE — ASSESSMENT
[FreeTextEntry1] : We have had a thorough discussion regarding his current condition, findings, and treatment options. Mr. CHU will proceed with updated MRIs/x-rays of the cervical and lumbar spine. He will follow up with Dr. Tracey once testing is completed. He did inquire about lumbar injections however he will hold off pending updated MRIs. He will consult with pain management for medical management.

## 2022-03-24 NOTE — HISTORY OF PRESENT ILLNESS
[de-identified] : Mr. CHU presents today for evaluation of low back pain mainly on the left side radiation into the buttock.  He pain is worse with ambulation.  He is unable to walk a long distance.  He also has some mild neck pain.  He denies upper extremity radiculopathy.  \par \par He does have a history of a TIA in the past.  At that time it affected his left arm.  He also has a history of bilateral carpal tunnel syndrome.  Since the TIA he has had some issues with his balance.  He saw ENT and vestibular therapy was recommended.  He has had physical therapy for his spinal symptoms.  No injections.  No bowel bladder dysfunction.  He denies deficits in fine motor skills. He did report a significant weight loss 70+ lbs over the past few years. He now weights 260 lbs. \par \par We have reviewed MRIs / Xrays of the cervical and lumbar spine from [facility] 5/2021. The images were reviewed with Dr. Bobo today as well. He is found to have diffuse cervical spondylosis with degenerative stenosis.  No evidence of cord signal changes.  Lumbar degenerative disc disease with a listhesis at L4-5 and a retrolisthesis at L5-S1. Stenosis is severe at L4-5.

## 2022-06-24 ENCOUNTER — NON-APPOINTMENT (OUTPATIENT)
Age: 62
End: 2022-06-24

## 2022-07-26 ENCOUNTER — APPOINTMENT (OUTPATIENT)
Dept: NEUROSURGERY | Facility: CLINIC | Age: 62
End: 2022-07-26

## 2022-07-26 DIAGNOSIS — M48.061 SPINAL STENOSIS, LUMBAR REGION WITHOUT NEUROGENIC CLAUDICATION: ICD-10-CM

## 2022-07-26 DIAGNOSIS — M48.02 SPINAL STENOSIS, CERVICAL REGION: ICD-10-CM

## 2022-07-26 PROCEDURE — 99214 OFFICE O/P EST MOD 30 MIN: CPT

## 2022-07-26 RX ORDER — DICLOFENAC SODIUM 1% 10 MG/G
1 GEL TOPICAL DAILY
Qty: 50 | Refills: 0 | Status: ACTIVE | COMMUNITY
Start: 2022-07-26 | End: 1900-01-01

## 2022-07-26 RX ORDER — LIDOCAINE 5 G/100G
5 OINTMENT TOPICAL
Qty: 120 | Refills: 0 | Status: ACTIVE | COMMUNITY
Start: 2022-07-26 | End: 1900-01-01

## 2022-07-26 NOTE — HISTORY OF PRESENT ILLNESS
[de-identified] : Mr. Ely presents today for neurosurgical consult. He is a previous patient of Dr. Bobo. He is here for evaluation of lower back pain mainly on the left side radiation into the buttock. He pain is worse with ambulation. He is unable to walk a long distance. He also has some neck pain which has worsened within the last month. He denies upper extremity radiculopathy. \par \par He does have a history of a TIA in the past. At that time it affected his left arm. He also has a history of bilateral carpal tunnel syndrome. Since the TIA he has had some issues with his balance. He saw ENT and vestibular therapy was recommended. He has had physical therapy for his spinal symptoms. No injections. No bowel bladder dysfunction. He denies deficits in fine motor skills. He did report a significant weight loss 70+ lbs over the past few years. He now weights 260 lbs. \par \par As a review he does not have any updated imaging for review today, his last MRIs of the lumbar and cervical spine were 05/2021, CDs were not present today though we did review reports. He has not undergone any cervical or lumbar injections by pain management.

## 2022-07-26 NOTE — PHYSICAL EXAM
[de-identified] : MRI 05/2021: We have reviewed MRIs / Xrays of the cervical and lumbar spine from [facility] 5/2021. The images were reviewed with Dr. Bobo today as well. He is found to have diffuse cervical spondylosis with degenerative stenosis. No evidence of cord signal changes. Lumbar degenerative disc disease with a listhesis at L4-5 and a retrolisthesis at L5-S1. Stenosis is severe at L4-5.

## 2022-07-26 NOTE — DISCUSSION/SUMMARY
[de-identified] : PLAN: Mr. Ely presents today for neurosurgical consult. He has a chief complaint of lower back pain mainly on the left side radiation into the buttock. He also has some mild neck pain. He has not had any updated imaging in over a year and his pain has worsened within the last 6 months. We need an MRI of both the cervical and lumbar spine as well as Xrays. I will see him back once completed.

## 2022-10-31 ENCOUNTER — APPOINTMENT (OUTPATIENT)
Dept: NEUROLOGY | Facility: CLINIC | Age: 62
End: 2022-10-31

## 2022-12-12 ENCOUNTER — APPOINTMENT (OUTPATIENT)
Dept: NEUROLOGY | Facility: CLINIC | Age: 62
End: 2022-12-12

## 2023-01-01 NOTE — ED ADULT NURSE NOTE - NS ED NURSE DC INFO COMPLEXITY
Infant's bili result came back at 9, notified MD and no new orders.  Will continue to monitor overnight.     Simple: Patient demonstrates quick and easy understanding

## 2023-01-31 ENCOUNTER — APPOINTMENT (OUTPATIENT)
Dept: ORTHOPEDIC SURGERY | Facility: CLINIC | Age: 63
End: 2023-01-31

## 2023-02-03 ENCOUNTER — APPOINTMENT (OUTPATIENT)
Dept: ORTHOPEDIC SURGERY | Facility: CLINIC | Age: 63
End: 2023-02-03

## 2023-04-11 ENCOUNTER — APPOINTMENT (OUTPATIENT)
Dept: ORTHOPEDIC SURGERY | Facility: CLINIC | Age: 63
End: 2023-04-11
Payer: MEDICAID

## 2023-04-11 VITALS — WEIGHT: 260 LBS | HEIGHT: 68 IN | BODY MASS INDEX: 39.4 KG/M2

## 2023-04-11 PROCEDURE — 99203 OFFICE O/P NEW LOW 30 MIN: CPT

## 2023-04-11 NOTE — IMAGING
[de-identified] :   Pleasant middle-aged gentleman sits reasonably comfortably in my office in no distress.  He is accompanied by his wife in the exam room.\par \par Right foot ankle:  Normal Sanchez test.  Tenderness palpation just proximal and anterior to the Achilles tendon insertion.  The there is palpable bony prominence of the posterior aspect of his Achilles.  Does not have tenderness in the retrocalcaneal bursa per se.  No tenderness along the tarsal canal or his posterior tibial tendon.  Midfoot is relatively supple and arch but slightly flattened is still intact.  The no callosities ulcers.  Good capillary refill.  Normal light sensation dorsal plantar aspect of foot.\par \par Radiographs from June 25, 2022 the patient is right ankle reviewed.  These include AP lateral mortise views which demonstrate some mild midfoot arthritis.  There is some early arthritis about his ankle joint.  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

## 2023-04-11 NOTE — HISTORY OF PRESENT ILLNESS
[de-identified] :  8-year-old gentleman presents to this office for evaluation of posterior heel pain for the last several years.  He has been worked up with x-rays and an MRI in the past.  He has been seen by podiatrist to refer the patient to our service.  Patient notes that he has pain in the back of his heel with prolonged walking (e.g.  Greater than 5 miles.)  The pain typically occurs the following day.  He has had physical therapy which is not meaningfully improved his symptoms.  Does not routinely require any braces or canes or supportive aids to walk.  Patient does not routinely take any pain medications for this discomfort.

## 2023-04-11 NOTE — ASSESSMENT
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  Patient has failed conservative management with NSAIDs and physical therapy.  I think he has a reasonably  Good candidate to consider a calcaneal osteotomy to remove the Gustavo's deformity in with with a without debridement of the Achilles tendon.  I am going to refer him to Dr. briggs since I my foot and ankle partner to discuss these treatment options.  He should bring the MRI of his foot with him.

## 2023-04-12 ENCOUNTER — APPOINTMENT (OUTPATIENT)
Dept: ORTHOPEDIC SURGERY | Facility: CLINIC | Age: 63
End: 2023-04-12
Payer: MEDICAID

## 2023-04-12 DIAGNOSIS — M76.61 ACHILLES TENDINITIS, RIGHT LEG: ICD-10-CM

## 2023-04-12 PROCEDURE — 99214 OFFICE O/P EST MOD 30 MIN: CPT

## 2023-04-12 NOTE — DATA REVIEWED
[FreeTextEntry1] : I reviewed the patient's x-rays which are in the [facility] as well as the MRI which was done at [facility].  The x-rays demonstrate calcific insertional Achilles tendinitis along with a prominent cavus deformity.  The MRI demonstrates severe Achilles tendinitis.

## 2023-04-12 NOTE — DISCUSSION/SUMMARY
[Medication Risks Reviewed] : Medication risks reviewed [Surgical risks reviewed] : Surgical risks reviewed [de-identified] : I had a lengthy discussion with the patient regarding his condition and treatment options available.  The patient has failed all other nonoperative modalities including physical therapy, anti-inflammatory use, tibia modification, shoewear modification.\par \par We discussed surgical treatment which for him would consist of a Achilles tendon debridement, calcaneal exostectomy and flexor hallucis longus transfer.  I had a lengthy discussion with the patient regarding the risks, benefits, and alternative of surgery. The risks of surgery discussed included but were not limited to infection, wound issues/breakdown, nonunion, malunion, suboptimal outcome, neurovascular compromise, failure of surgery, need for revision surgery, deep vein thrombosis, pulmonary embolism, loss of limb, and loss of life. We also discussed the expected postoperative recovery protocol including periods of immobilization and weight bearing.  He will think about surgery and call us back if he wishes to proceed forward.\par \par

## 2023-04-12 NOTE — IMAGING
[de-identified] : He is alert oriented x3.  He is pleasant cooperative that exam.  I examined his right lower extremity.  The patient on standing demonstrates a prominent Gustavo's deformity.  This corresponds to his area of tenderness.  He is quite tender to palpation at the Achilles insertion.  There is ankle dorsiflexion present to 0 degrees.  There is an intact Achilles tendon with a normal Sanchez's test.  Calf is nontender to palpation.  He is neurovascular intact distally.

## 2023-04-12 NOTE — HISTORY OF PRESENT ILLNESS
[de-identified] : This is a very pleasant 62-year-old patient who is accompanied by his wife who presents for right Achilles pain.  He states that this pain has been present for several years.  He has failed nonoperative management in the form physical therapy and shoewear modification.  He still localizes the pain to the Achilles insertion.  There is a prominence in the back of his heel which causes difficulty with shoe wear and pain.  He has had a prior MRI done at [facility] and prior x-rays done in the [facility] system.  Denies any traumatic event.

## 2023-04-25 ENCOUNTER — EMERGENCY (EMERGENCY)
Facility: HOSPITAL | Age: 63
LOS: 0 days | Discharge: HOME | End: 2023-04-25
Attending: EMERGENCY MEDICINE
Payer: MEDICAID

## 2023-04-25 VITALS
DIASTOLIC BLOOD PRESSURE: 80 MMHG | HEART RATE: 76 BPM | SYSTOLIC BLOOD PRESSURE: 133 MMHG | RESPIRATION RATE: 19 BRPM | TEMPERATURE: 99 F | WEIGHT: 287.92 LBS | HEIGHT: 68 IN | OXYGEN SATURATION: 96 %

## 2023-04-25 DIAGNOSIS — Z88.1 ALLERGY STATUS TO OTHER ANTIBIOTIC AGENTS STATUS: ICD-10-CM

## 2023-04-25 DIAGNOSIS — M10.9 GOUT, UNSPECIFIED: ICD-10-CM

## 2023-04-25 DIAGNOSIS — J44.9 CHRONIC OBSTRUCTIVE PULMONARY DISEASE, UNSPECIFIED: ICD-10-CM

## 2023-04-25 DIAGNOSIS — B37.0 CANDIDAL STOMATITIS: ICD-10-CM

## 2023-04-25 DIAGNOSIS — J02.9 ACUTE PHARYNGITIS, UNSPECIFIED: ICD-10-CM

## 2023-04-25 DIAGNOSIS — Z88.0 ALLERGY STATUS TO PENICILLIN: ICD-10-CM

## 2023-04-25 PROCEDURE — 99283 EMERGENCY DEPT VISIT LOW MDM: CPT

## 2023-04-25 PROCEDURE — 99284 EMERGENCY DEPT VISIT MOD MDM: CPT

## 2023-04-25 RX ORDER — NYSTATIN 500MM UNIT
500000 POWDER (EA) MISCELLANEOUS ONCE
Refills: 0 | Status: COMPLETED | OUTPATIENT
Start: 2023-04-25 | End: 2023-04-25

## 2023-04-25 RX ORDER — NYSTATIN 500MM UNIT
4 POWDER (EA) MISCELLANEOUS
Qty: 200 | Refills: 0
Start: 2023-04-25 | End: 2023-05-01

## 2023-04-25 RX ADMIN — Medication 500000 UNIT(S): at 06:54

## 2023-04-25 NOTE — ED PROVIDER NOTE - NSFOLLOWUPINSTRUCTIONS_ED_ALL_ED_FT
You were seen for sore throat.  Her current diagnosis is oral thrush.  This is a overgrowth of Candida albicans which is a fungus.  If you develop fevers throat swelling difficulty speaking or breathing please return immediately to the ER.  Please use the medications to treat symptoms.  This medication should be used every 6 hours swish and swallow for approximately 14 days or 48 hours after your symptoms have disappeared.

## 2023-04-25 NOTE — ED PROVIDER NOTE - PATIENT PORTAL LINK FT
You can access the FollowMyHealth Patient Portal offered by [facility] by registering at the following website: [email or web address]. By joining "[employer]’s FollowMyHealth portal, you will also be able to view your health information using other applications (apps) compatible with our system.

## 2023-04-25 NOTE — ED PROVIDER NOTE - CARE PROVIDER_API CALL
Ollie García)  Family Medicine  [address] [place] [id number]  Phone: [phone]  Fax: [phone]  Follow Up Time: 1-3 Days

## 2023-04-25 NOTE — ED PROVIDER NOTE - PHYSICAL EXAMINATION
CONSTITUTIONAL: Well-developed; well-nourished; in no acute distress, nontoxic appearing  SKIN: skin exam is warm and dry,  HEAD: Normocephalic; atraumatic.  EYES: PERRL, 3 mm bilateral, no nystagmus, EOM intact; conjunctiva and sclera clear.  ENT: Posterior pharynx with white plaques unable to be removed present on the oral mucosa palate and oropharynx.No lymphadenopathy.  NECK: Supple; non tender.  ROM intact.  CARD: S1, S2 normal, no murmur  RESP: No wheezes, rales or rhonchi. Good air movement bilaterally

## 2023-04-25 NOTE — ED PROVIDER NOTE - CLINICAL SUMMARY MEDICAL DECISION MAKING FREE TEXT BOX
Patient with oral thrush likely secondary to Advair use.  Able to tolerate p.o.  Given nystatin and prescription sent.  Indications to return to the ED were discussed and patient understands return precautions.

## 2023-05-12 ENCOUNTER — NON-APPOINTMENT (OUTPATIENT)
Age: 63
End: 2023-05-12

## 2023-10-26 NOTE — ED ADULT NURSE NOTE - PRIMARY CARE PROVIDER
PMD Opioid Pregnancy And Lactation Text: These medications can lead to premature delivery and should be avoided during pregnancy. These medications are also present in breast milk in small amounts.

## 2024-08-17 ENCOUNTER — NON-APPOINTMENT (OUTPATIENT)
Age: 64
End: 2024-08-17

## 2024-09-12 ENCOUNTER — APPOINTMENT (OUTPATIENT)
Dept: PAIN MANAGEMENT | Facility: CLINIC | Age: 64
End: 2024-09-12

## 2025-01-15 ENCOUNTER — NON-APPOINTMENT (OUTPATIENT)
Age: 65
End: 2025-01-15

## 2025-08-19 ENCOUNTER — NON-APPOINTMENT (OUTPATIENT)
Age: 65
End: 2025-08-19